# Patient Record
Sex: FEMALE | Race: BLACK OR AFRICAN AMERICAN | NOT HISPANIC OR LATINO | URBAN - METROPOLITAN AREA
[De-identification: names, ages, dates, MRNs, and addresses within clinical notes are randomized per-mention and may not be internally consistent; named-entity substitution may affect disease eponyms.]

---

## 2019-08-10 ENCOUNTER — EMERGENCY (EMERGENCY)
Facility: HOSPITAL | Age: 49
LOS: 1 days | Discharge: HOME | End: 2019-08-10
Attending: EMERGENCY MEDICINE | Admitting: EMERGENCY MEDICINE
Payer: COMMERCIAL

## 2019-08-10 VITALS
SYSTOLIC BLOOD PRESSURE: 129 MMHG | OXYGEN SATURATION: 100 % | DIASTOLIC BLOOD PRESSURE: 84 MMHG | TEMPERATURE: 99 F | HEART RATE: 76 BPM | RESPIRATION RATE: 16 BRPM

## 2019-08-10 LAB
ALBUMIN SERPL ELPH-MCNC: 4.1 G/DL — SIGNIFICANT CHANGE UP (ref 3.3–5)
ALP SERPL-CCNC: 54 U/L — SIGNIFICANT CHANGE UP (ref 40–120)
ALT FLD-CCNC: 9 U/L — SIGNIFICANT CHANGE UP (ref 4–33)
ANION GAP SERPL CALC-SCNC: 12 MMO/L — SIGNIFICANT CHANGE UP (ref 7–14)
APTT BLD: 32.5 SEC — SIGNIFICANT CHANGE UP (ref 27.5–36.3)
AST SERPL-CCNC: 14 U/L — SIGNIFICANT CHANGE UP (ref 4–32)
BASOPHILS # BLD AUTO: 0.04 K/UL — SIGNIFICANT CHANGE UP (ref 0–0.2)
BASOPHILS NFR BLD AUTO: 0.6 % — SIGNIFICANT CHANGE UP (ref 0–2)
BILIRUB SERPL-MCNC: 0.4 MG/DL — SIGNIFICANT CHANGE UP (ref 0.2–1.2)
BUN SERPL-MCNC: 9 MG/DL — SIGNIFICANT CHANGE UP (ref 7–23)
CALCIUM SERPL-MCNC: 9.8 MG/DL — SIGNIFICANT CHANGE UP (ref 8.4–10.5)
CHLORIDE SERPL-SCNC: 105 MMOL/L — SIGNIFICANT CHANGE UP (ref 98–107)
CK MB BLD-MCNC: 1.46 NG/ML — SIGNIFICANT CHANGE UP (ref 1–4.7)
CK MB BLD-MCNC: SIGNIFICANT CHANGE UP (ref 0–2.5)
CK SERPL-CCNC: 56 U/L — SIGNIFICANT CHANGE UP (ref 25–170)
CO2 SERPL-SCNC: 25 MMOL/L — SIGNIFICANT CHANGE UP (ref 22–31)
CREAT SERPL-MCNC: 0.8 MG/DL — SIGNIFICANT CHANGE UP (ref 0.5–1.3)
EOSINOPHIL # BLD AUTO: 0.11 K/UL — SIGNIFICANT CHANGE UP (ref 0–0.5)
EOSINOPHIL NFR BLD AUTO: 1.6 % — SIGNIFICANT CHANGE UP (ref 0–6)
GLUCOSE SERPL-MCNC: 96 MG/DL — SIGNIFICANT CHANGE UP (ref 70–99)
HCT VFR BLD CALC: 38.1 % — SIGNIFICANT CHANGE UP (ref 34.5–45)
HGB BLD-MCNC: 12.3 G/DL — SIGNIFICANT CHANGE UP (ref 11.5–15.5)
IMM GRANULOCYTES NFR BLD AUTO: 0.1 % — SIGNIFICANT CHANGE UP (ref 0–1.5)
INR BLD: 1.18 — HIGH (ref 0.88–1.17)
LYMPHOCYTES # BLD AUTO: 2.09 K/UL — SIGNIFICANT CHANGE UP (ref 1–3.3)
LYMPHOCYTES # BLD AUTO: 31.2 % — SIGNIFICANT CHANGE UP (ref 13–44)
MCHC RBC-ENTMCNC: 30.7 PG — SIGNIFICANT CHANGE UP (ref 27–34)
MCHC RBC-ENTMCNC: 32.3 % — SIGNIFICANT CHANGE UP (ref 32–36)
MCV RBC AUTO: 95 FL — SIGNIFICANT CHANGE UP (ref 80–100)
MONOCYTES # BLD AUTO: 0.65 K/UL — SIGNIFICANT CHANGE UP (ref 0–0.9)
MONOCYTES NFR BLD AUTO: 9.7 % — SIGNIFICANT CHANGE UP (ref 2–14)
NEUTROPHILS # BLD AUTO: 3.8 K/UL — SIGNIFICANT CHANGE UP (ref 1.8–7.4)
NEUTROPHILS NFR BLD AUTO: 56.8 % — SIGNIFICANT CHANGE UP (ref 43–77)
NRBC # FLD: 0 K/UL — SIGNIFICANT CHANGE UP (ref 0–0)
PLATELET # BLD AUTO: 219 K/UL — SIGNIFICANT CHANGE UP (ref 150–400)
PMV BLD: 9.9 FL — SIGNIFICANT CHANGE UP (ref 7–13)
POTASSIUM SERPL-MCNC: 4.7 MMOL/L — SIGNIFICANT CHANGE UP (ref 3.5–5.3)
POTASSIUM SERPL-SCNC: 4.7 MMOL/L — SIGNIFICANT CHANGE UP (ref 3.5–5.3)
PROT SERPL-MCNC: 7.6 G/DL — SIGNIFICANT CHANGE UP (ref 6–8.3)
PROTHROM AB SERPL-ACNC: 13.2 SEC — HIGH (ref 9.8–13.1)
RBC # BLD: 4.01 M/UL — SIGNIFICANT CHANGE UP (ref 3.8–5.2)
RBC # FLD: 13.2 % — SIGNIFICANT CHANGE UP (ref 10.3–14.5)
SODIUM SERPL-SCNC: 142 MMOL/L — SIGNIFICANT CHANGE UP (ref 135–145)
TROPONIN T, HIGH SENSITIVITY: < 6 NG/L — SIGNIFICANT CHANGE UP (ref ?–14)
WBC # BLD: 6.7 K/UL — SIGNIFICANT CHANGE UP (ref 3.8–10.5)
WBC # FLD AUTO: 6.7 K/UL — SIGNIFICANT CHANGE UP (ref 3.8–10.5)

## 2019-08-10 PROCEDURE — 93010 ELECTROCARDIOGRAM REPORT: CPT | Mod: 59

## 2019-08-10 PROCEDURE — 93971 EXTREMITY STUDY: CPT | Mod: 26,LT

## 2019-08-10 PROCEDURE — 99218: CPT | Mod: 25

## 2019-08-10 PROCEDURE — 71046 X-RAY EXAM CHEST 2 VIEWS: CPT | Mod: 26

## 2019-08-10 RX ORDER — ACETAMINOPHEN 500 MG
650 TABLET ORAL ONCE
Refills: 0 | Status: COMPLETED | OUTPATIENT
Start: 2019-08-10 | End: 2019-08-10

## 2019-08-10 RX ORDER — SODIUM CHLORIDE 9 MG/ML
1000 INJECTION INTRAMUSCULAR; INTRAVENOUS; SUBCUTANEOUS ONCE
Refills: 0 | Status: COMPLETED | OUTPATIENT
Start: 2019-08-10 | End: 2019-08-10

## 2019-08-10 RX ORDER — METOCLOPRAMIDE HCL 10 MG
10 TABLET ORAL ONCE
Refills: 0 | Status: COMPLETED | OUTPATIENT
Start: 2019-08-10 | End: 2019-08-10

## 2019-08-10 RX ADMIN — Medication 10 MILLIGRAM(S): at 18:44

## 2019-08-10 RX ADMIN — SODIUM CHLORIDE 1000 MILLILITER(S): 9 INJECTION INTRAMUSCULAR; INTRAVENOUS; SUBCUTANEOUS at 21:19

## 2019-08-10 RX ADMIN — Medication 650 MILLIGRAM(S): at 18:44

## 2019-08-10 RX ADMIN — SODIUM CHLORIDE 2000 MILLILITER(S): 9 INJECTION INTRAMUSCULAR; INTRAVENOUS; SUBCUTANEOUS at 18:22

## 2019-08-10 NOTE — ED PROVIDER NOTE - OBJECTIVE STATEMENT
48 year old female with PMH of Asthma presents to the ED s/p a syncopal episode at 11am today. Pt states she went to walk her dog and stopped to speak to her neighbor when she suddenly felt lightheaded and passed out, fell backwards and hit her head on the ground. Pt states as per neighbor she lost consciousness for a few minutes. At this time, pt complains of headache to occipital region, severity 4/10, constant, not progressive or worsening. Pt denies weakness, numbness or tingling sensation, chest pain, dyspnea, palpitations, leg pain or swelling, fever and chills. Pt denies a personal or family hx of PE/ACS. Pt denies any other complaints.

## 2019-08-10 NOTE — ED ADULT NURSE REASSESSMENT NOTE - NS ED NURSE REASSESS COMMENT FT1
Report received from david RN. Pt aaox4. Vital signs reassessed as noted. Pt denies chest pain, SOB, vision changes, HA, diaphoresis, palpitations. Pt repositioned for comfort. Bed in lowest position. Will continue to monitor.

## 2019-08-10 NOTE — ED ADULT NURSE NOTE - OBJECTIVE STATEMENT
Patient reports syncopal episode today, fell backwards onto head. Patient is a&ox4, able to converse appropriately. No hematoma/deformity on assessment. Patient's EKG shows no acute changes. Patient placed on continuous cardiac monitor, labs drawn, #20g placed into right AC. Patient pending XR and further evaluation.

## 2019-08-10 NOTE — ED ADULT TRIAGE NOTE - CHIEF COMPLAINT QUOTE
Pt. c/o syncopal episode earlier today hitting back of head on gravel. States she felt lightheaded and blurry vision beforehand. Now c/o headache and weakness. Denies n/v, cp, sob or palpitations. Denies blood thinner use.

## 2019-08-10 NOTE — ED PROVIDER NOTE - CLINICAL SUMMARY MEDICAL DECISION MAKING FREE TEXT BOX
48 year old female with PMH of Asthma presents to the ED s/p a syncopal episode at 11am today. Hemodynamically stable, neuro non-focal, c-spine cleared by [facility] c-spine yumiko. EKG- NSR, rate 73, prolonged QT. Plan for labs including cardiac markers, CXR, and likely CDU for an TTE. Monitor and Reassess. 48 year old female with PMH of Asthma presents to the ED s/p a syncopal episode at 11am today. Hemodynamically stable, neuro non-focal, c-spine cleared by [facility] c-spine yumiko. EKG- NSR, rate 73. Plan for labs including cardiac markers, CXR, and likely CDU for an TTE. Monitor and Reassess. 48 year old female with PMH of Asthma presents to the ED s/p a syncopal episode at 11am today. Hemodynamically stable, neuro non-focal, head and c-spine cleared by [facility] and [place] CT head rule, EKG- NSR, rate 73. Plan for labs including cardiac markers, CXR, and likely CDU for an TTE. Monitor and Reassess.

## 2019-08-10 NOTE — ED PROVIDER NOTE - ATTENDING CONTRIBUTION TO CARE
I was physically present for the E/M service provided. I agree with above history, physical, and plan which I have reviewed and edited where appropriate. I was physically present for the key portions of the service provided.     Attending Exam - Dr. Carpenter: GEN: well appearing, NAD  HEENT: +PERRL, EOMI  RESP: CTAB, no signs of respiratory distress CV: s1s2 RRR ABD: soft/non tender/non distended  MSK: slight questionable LLE swelling as compared with R, no edema, no deformities / normal range of motion, spine grossly normal NEURO: alert, non focal exam SKIN: normal color / temperature / condition.

## 2019-08-10 NOTE — ED ADULT NURSE NOTE - NSIMPLEMENTINTERV_GEN_ALL_ED
Implemented All Universal Safety Interventions:  [place] to call system. Call bell, personal items and telephone within reach. Instruct patient to call for assistance. Room bathroom lighting operational. Non-slip footwear when patient is off stretcher. Physically safe environment: no spills, clutter or unnecessary equipment. Stretcher in lowest position, wheels locked, appropriate side rails in place.

## 2019-08-11 VITALS
DIASTOLIC BLOOD PRESSURE: 61 MMHG | TEMPERATURE: 98 F | RESPIRATION RATE: 16 BRPM | SYSTOLIC BLOOD PRESSURE: 108 MMHG | HEART RATE: 68 BPM | OXYGEN SATURATION: 99 %

## 2019-08-11 PROCEDURE — 70450 CT HEAD/BRAIN W/O DYE: CPT | Mod: 26

## 2019-08-11 PROCEDURE — 99217: CPT

## 2019-08-11 RX ORDER — ACETAMINOPHEN 500 MG
650 TABLET ORAL ONCE
Refills: 0 | Status: COMPLETED | OUTPATIENT
Start: 2019-08-11 | End: 2019-08-11

## 2019-08-11 RX ADMIN — Medication 650 MILLIGRAM(S): at 05:19

## 2019-08-11 RX ADMIN — Medication 650 MILLIGRAM(S): at 04:19

## 2019-08-11 NOTE — ED CDU PROVIDER DISPOSITION NOTE - CLINICAL COURSE
49 y/o F PMHx asthma here sp episode of syncope yesterday. Pt was sent to CDU for tele and an echo this morning. Overnight pt additionally had a CT head that was negative. This morning, pt does not want to stay for echo. Called echo lab to see if we can expedite, however said they can only take her later this afternoon. Pt states she is traveling from [place] and would like to be discharged now. Has a cardiologist to follow up with, Dr. Keen, this week. Also called Dr. Vargas who said that he can see her tomorrow. The pt is clinically sober, AA&Ox3, free from distracting injury.  pt with limited work up secondary to concern for driving home and taking care of dog at home.  Pt is alert and oriented. The pt understands the illness, suggested treatment and risks and benefits. They understand the risk including permanent disability, or death. The pt is acting rationally and does not want our suggested treatment. They were instructed to f/u with their pmd immediately and instructed to return with any concerns.

## 2019-08-11 NOTE — ED CDU PROVIDER INITIAL DAY NOTE - OBJECTIVE STATEMENT
48 year old female with PMH of Asthma coming in with syncopal episode earlier today. Pt states woke up feeling a little tired ,took her dog for a walk was talking to her friend and started to feel dizzy and like she was going to pass out, per friend passed out for a few minutes, falling backwards hitting her head. no seizure like activity. Pt states felt okay after but did have a headache. Pt states told she has low BP and states has felt like she was going to pass out before and knows her BP is low, felt like todays symptoms. PT denies any recent travel. Denies fevers, chills, chest pain, sob, cough, n/v/d, abdominal pain, numbness, tingling, weakness. Sent to CDU for tele, echo and reassessments.  PMD Dr. Nuno

## 2019-08-11 NOTE — ED CDU PROVIDER SUBSEQUENT DAY NOTE - HISTORY
47 y/o F PMHx asthma here sp episode of syncope yesterday. Pt was sent to CDU for tele and an echo this morning. Overnight pt additionally had a CT head that was negative. This morning, pt does not want to stay for echo. Called echo lab to see if we can expedite, however said they can only take her later this afternoon. Pt states she is traveling from [place] and would like to be discharged now. Has a cardiologist to follow up with, Dr. Keen, this week. Also called Dr. Vargas who said that he can see her tomorrow. The pt is clinically sober, AA&Ox3, free from distracting injury.  Throughout our interactions in the CDU today, the pt has demonstrated concrete thinking/reasoning, has maintained an orderly/reasonable conversation, appears to have intact insight/judgment/reason and therefore in our opinion has capacity to make decisions. The pt verbalized an understanding of our worries. We’ve told the patient that the ED evaluation is incomplete & many troublesome conditions haven’t been r/o.   We have discussed the need for further ED w/u so we can get more information about the cause of her syncope. We have discussed the range of possible dx, potential testing & tx options.  We’ve made  numerous efforts to prevent the pt from leaving AMA.  Our discussions included the potential outcomes of leaving AMA, including worsening of their condition, becoming permanently disabled/in pain/critically ill, or death.  Despite these efforts, we were unable to convince the pt to stay.  The pt is refusing any  further care and is leaving against medical advice. We have attempted to offer tx/rx/guidance for any dangerous conditions which are most likely and/or dangerous.  We have answered all questions and have implored the pt to return ASAP to complete the w/u.

## 2019-08-11 NOTE — ED CDU PROVIDER DISPOSITION NOTE - ATTENDING CONTRIBUTION TO CARE
I performed a face to face bedside interview with patient regarding history of present illness, review of symptoms and past medical history. I completed an independent physical exam.  I have discussed patient's plan of care.   I agree with note as stated above, having amended the EMR as needed to reflect my findings. I have discussed the assessment and plan of care.  This includes during the time I functioned as the attending physician for this patient.  Attending Contribution to Care: agree with plan of pa. 47 y/o F PMHx asthma here sp episode of syncope yesterday. Pt was sent to CDU for tele and an echo this morning. Overnight pt additionally had a CT head that was negative. This morning, pt does not want to stay for echo. Called echo lab to see if we can expedite, however said they can only take her later this afternoon. Pt states she is traveling from [place] and would like to be discharged now. Has a cardiologist to follow up with, Dr. Keen, this week. Also called Dr. Vargas who said that he can see her tomorrow. The pt is clinically sober, AA&Ox3, free from distracting injury.  pt with limited work up secondary to concern for driving home and taking care of dog at home.  Pt is alert and oriented. The pt understands the illness, suggested treatment and risks and benefits. They understand the risk including permanent disability, or death. The pt is acting rationally and does not want our suggested treatment. They were instructed to f/u with their pmd immediately and instructed to return with any concerns.

## 2019-08-11 NOTE — ED CDU PROVIDER SUBSEQUENT DAY NOTE - PROGRESS NOTE DETAILS
Pt complained of headache, given fall with head trauma CT head added on. CT no acute findings. pt has been otherwise resting comfortably, will continue to monitor.

## 2019-08-11 NOTE — ED CDU PROVIDER SUBSEQUENT DAY NOTE - MEDICAL DECISION MAKING DETAILS
49 y/o F w/ an episode of syncope yesterday, plan for tele and echo today however pt wants to leave prior to completion of work up. Has good cardiology and PMD follow up.

## 2019-08-11 NOTE — ED CDU PROVIDER DISPOSITION NOTE - NSFOLLOWUPINSTRUCTIONS_ED_ALL_ED_FT
See your primary care doctor within 24-48 hours. Follow up with cardiology this week for further evaluation as we did not complete your cardiac work up in the ED, bring copies of all reports with you. Drink plenty of water and eat multiple small meals throughout the day. Return to the ER for worsening symptoms or any other concerns.

## 2019-08-11 NOTE — ED CDU PROVIDER INITIAL DAY NOTE - ATTENDING CONTRIBUTION TO CARE
I performed a face to face bedside interview with patient regarding history of present illness, review of symptoms and past medical history. I completed an independent physical exam.  I have discussed patient's plan of care with PA.   I agree with note as stated above, having amended the EMR as needed to reflect my findings. I have discussed the assessment and plan of care.  This includes during the time I functioned as the attending physician for this patient.     Attending Exam - Dr. Carpenter: GEN: well appearing, NAD  HEENT: +PERRL, EOMI  RESP: CTAB, no signs of respiratory distress CV: s1s2 RRR ABD: soft/non tender/non distended  MSK: no deformities / swelling, normal range of motion, spine grossly normal NEURO: alert, non focal exam SKIN: normal color / temperature / condition.

## 2019-08-11 NOTE — ED CDU PROVIDER SUBSEQUENT DAY NOTE - ATTENDING CONTRIBUTION TO CARE
I performed a face to face bedside interview with patient regarding history of present illness, review of symptoms and past medical history. I completed an independent physical exam.  I have discussed patient's plan of care.   I agree with note as stated above, having amended the EMR as needed to reflect my findings. I have discussed the assessment and plan of care.  This includes during the time I functioned as the attending physician for this patient.  Attending Contribution to Care: agree with plan of pa. 47 y/o F PMHx asthma here sp episode of syncope yesterday. Pt was sent to CDU for tele and an echo this morning. Overnight pt additionally had a CT head that was negative. This morning, pt does not want to stay for echo. Called echo lab to see if we can expedite, however said they can only take her later this afternoon. Pt states she is traveling from [place] and would like to be discharged now. Has a cardiologist to follow up with, Dr. Keen, this week. Also called Dr. Vargas who said that he can see her tomorrow. The pt is clinically sober, AA&Ox3, free from distracting injury.  Throughout our interactions in the CDU today, the pt has demonstrated concrete thinking/reasoning, has maintained an orderly/reasonable conversation, appears to have intact insight/judgment/reason and therefore in our opinion has capacity to make decisions. The pt verbalized an understanding of our worries. We’ve told the patient that the ED evaluation is incomplete & many troublesome conditions haven’t been r/o.

## 2023-01-01 NOTE — ED CDU PROVIDER SUBSEQUENT DAY NOTE - PSYCHIATRIC, MLM
Alert and oriented to person, place, time/situation. normal mood and affect. no apparent risk to self or others. [employer]
